# Patient Record
Sex: FEMALE | Race: OTHER | NOT HISPANIC OR LATINO | URBAN - METROPOLITAN AREA
[De-identification: names, ages, dates, MRNs, and addresses within clinical notes are randomized per-mention and may not be internally consistent; named-entity substitution may affect disease eponyms.]

---

## 2018-01-30 PROBLEM — Z00.00 ENCOUNTER FOR PREVENTIVE HEALTH EXAMINATION: Status: ACTIVE | Noted: 2018-01-30

## 2018-02-06 ENCOUNTER — OUTPATIENT (OUTPATIENT)
Dept: OUTPATIENT SERVICES | Facility: HOSPITAL | Age: 68
LOS: 1 days | Discharge: HOME | End: 2018-02-06

## 2018-02-06 DIAGNOSIS — G62.9 POLYNEUROPATHY, UNSPECIFIED: ICD-10-CM

## 2018-02-06 DIAGNOSIS — K21.9 GASTRO-ESOPHAGEAL REFLUX DISEASE WITHOUT ESOPHAGITIS: ICD-10-CM

## 2018-02-06 DIAGNOSIS — I10 ESSENTIAL (PRIMARY) HYPERTENSION: ICD-10-CM

## 2018-02-08 ENCOUNTER — APPOINTMENT (OUTPATIENT)
Dept: PODIATRY | Facility: CLINIC | Age: 68
End: 2018-02-08
Payer: MEDICARE

## 2018-02-08 ENCOUNTER — OUTPATIENT (OUTPATIENT)
Dept: OUTPATIENT SERVICES | Facility: HOSPITAL | Age: 68
LOS: 1 days | Discharge: HOME | End: 2018-02-08

## 2018-02-08 VITALS — DIASTOLIC BLOOD PRESSURE: 88 MMHG | SYSTOLIC BLOOD PRESSURE: 148 MMHG

## 2018-02-08 DIAGNOSIS — M20.5X9 OTHER DEFORMITIES OF TOE(S) (ACQUIRED), UNSPECIFIED FOOT: ICD-10-CM

## 2018-02-08 DIAGNOSIS — M20.41 OTHER HAMMER TOE(S) (ACQUIRED), RIGHT FOOT: ICD-10-CM

## 2018-02-08 DIAGNOSIS — M20.42 OTHER HAMMER TOE(S) (ACQUIRED), LEFT FOOT: ICD-10-CM

## 2018-02-08 DIAGNOSIS — M20.10 HALLUX VALGUS (ACQUIRED), UNSPECIFIED FOOT: ICD-10-CM

## 2018-02-08 DIAGNOSIS — B35.1 TINEA UNGUIUM: ICD-10-CM

## 2018-02-08 DIAGNOSIS — M79.673 PAIN IN UNSPECIFIED FOOT: ICD-10-CM

## 2018-02-08 DIAGNOSIS — M20.42 OTHER HAMMER TOE(S) (ACQUIRED), RIGHT FOOT: ICD-10-CM

## 2018-02-08 DIAGNOSIS — L85.3 XEROSIS CUTIS: ICD-10-CM

## 2018-02-08 PROCEDURE — ZZZZZ: CPT

## 2018-02-15 DIAGNOSIS — B35.1 TINEA UNGUIUM: ICD-10-CM

## 2018-02-15 DIAGNOSIS — L85.3 XEROSIS CUTIS: ICD-10-CM

## 2018-02-15 DIAGNOSIS — G62.9 POLYNEUROPATHY, UNSPECIFIED: ICD-10-CM

## 2018-02-20 ENCOUNTER — OUTPATIENT (OUTPATIENT)
Dept: OUTPATIENT SERVICES | Facility: HOSPITAL | Age: 68
LOS: 1 days | Discharge: HOME | End: 2018-02-20

## 2018-02-20 DIAGNOSIS — G62.9 POLYNEUROPATHY, UNSPECIFIED: ICD-10-CM

## 2018-02-20 DIAGNOSIS — K21.9 GASTRO-ESOPHAGEAL REFLUX DISEASE WITHOUT ESOPHAGITIS: ICD-10-CM

## 2018-04-19 ENCOUNTER — APPOINTMENT (OUTPATIENT)
Dept: PODIATRY | Facility: CLINIC | Age: 68
End: 2018-04-19
Payer: MEDICARE

## 2018-04-19 ENCOUNTER — OUTPATIENT (OUTPATIENT)
Dept: OUTPATIENT SERVICES | Facility: HOSPITAL | Age: 68
LOS: 1 days | Discharge: HOME | End: 2018-04-19

## 2018-04-19 VITALS — WEIGHT: 172 LBS | BODY MASS INDEX: 29.37 KG/M2 | HEIGHT: 64 IN

## 2018-04-19 DIAGNOSIS — G57.62 LESION OF PLANTAR NERVE, LEFT LOWER LIMB: ICD-10-CM

## 2018-04-19 DIAGNOSIS — G62.9 POLYNEUROPATHY, UNSPECIFIED: ICD-10-CM

## 2018-04-19 DIAGNOSIS — M79.672 PAIN IN LEFT FOOT: ICD-10-CM

## 2018-04-19 DIAGNOSIS — M79.671 PAIN IN RIGHT FOOT: ICD-10-CM

## 2018-04-19 DIAGNOSIS — G57.61 LESION OF PLANTAR NERVE, RIGHT LOWER LIMB: ICD-10-CM

## 2018-04-19 PROCEDURE — 99212 OFFICE O/P EST SF 10 MIN: CPT

## 2018-05-16 PROBLEM — G62.9 NEUROPATHIC PAIN OF BOTH FEET: Status: ACTIVE | Noted: 2018-02-08

## 2019-07-26 ENCOUNTER — APPOINTMENT (OUTPATIENT)
Dept: CARDIOLOGY | Facility: CLINIC | Age: 69
End: 2019-07-26

## 2019-07-30 ENCOUNTER — APPOINTMENT (OUTPATIENT)
Dept: VASCULAR SURGERY | Facility: CLINIC | Age: 69
End: 2019-07-30
Payer: MEDICARE

## 2019-07-30 VITALS
DIASTOLIC BLOOD PRESSURE: 80 MMHG | SYSTOLIC BLOOD PRESSURE: 122 MMHG | WEIGHT: 158 LBS | HEIGHT: 64 IN | BODY MASS INDEX: 26.98 KG/M2

## 2019-07-30 PROCEDURE — 99203 OFFICE O/P NEW LOW 30 MIN: CPT

## 2019-07-30 PROCEDURE — 93970 EXTREMITY STUDY: CPT

## 2019-07-30 NOTE — DATA REVIEWED
[FreeTextEntry1] : I performed a venous duplex which was medically necessary to evaluate for GSV incompetency. No evidence of DVT or superficial phlebitis in the lower extremities bilaterally, right GSV not visualized, left GSV was negative for reflux, incompetent varicosities in the right thigh and calf measuring 6 mm.\par

## 2019-07-30 NOTE — ASSESSMENT
[FreeTextEntry1] : 69 y/o female with h/o varicose veins, presents for evaluation, has h/o right leg vein ligation in [place] several years ago. She c/o pain in the varicosities, and leg swelling at the end of the day, especially the right leg, denies any h/o DVT, could not tolerate compression stockings in the past.\par I performed a venous duplex which was medically necessary to evaluate for GSV incompetency. No evidence of DVT or superficial phlebitis in the lower extremities bilaterally, right GSV not visualized, left GSV was negative for reflux, incompetent varicosities in the right thigh and calf measuring 6 mm.\par She will require right lower extremity stab microphlebectomy for treatment. She prefers conservative management and was advised to use compression stockings as tolerated. She can follow up as needed.

## 2019-07-30 NOTE — HISTORY OF PRESENT ILLNESS
[FreeTextEntry1] : 69 y/o female with h/o varicose veins, presents for evaluation, has h/o right leg vein ligation in [place] several years ago. She c/o pain in the varicosities, and leg swelling at the end of the day, especially the right leg, denies any h/o DVT, could not tolerate compression stockings in the past.

## 2019-08-22 ENCOUNTER — APPOINTMENT (OUTPATIENT)
Dept: CARDIOLOGY | Facility: CLINIC | Age: 69
End: 2019-08-22
Payer: MEDICARE

## 2019-08-22 PROCEDURE — 99214 OFFICE O/P EST MOD 30 MIN: CPT

## 2019-08-22 PROCEDURE — 93000 ELECTROCARDIOGRAM COMPLETE: CPT

## 2019-09-10 ENCOUNTER — APPOINTMENT (OUTPATIENT)
Dept: VASCULAR SURGERY | Facility: CLINIC | Age: 69
End: 2019-09-10
Payer: MEDICARE

## 2019-09-10 VITALS
DIASTOLIC BLOOD PRESSURE: 74 MMHG | HEIGHT: 64 IN | WEIGHT: 156 LBS | BODY MASS INDEX: 26.63 KG/M2 | SYSTOLIC BLOOD PRESSURE: 120 MMHG

## 2019-09-10 DIAGNOSIS — I73.9 PERIPHERAL VASCULAR DISEASE, UNSPECIFIED: ICD-10-CM

## 2019-09-10 PROCEDURE — 93925 LOWER EXTREMITY STUDY: CPT

## 2019-09-10 PROCEDURE — 99213 OFFICE O/P EST LOW 20 MIN: CPT

## 2019-09-10 NOTE — HISTORY OF PRESENT ILLNESS
[FreeTextEntry1] : 69 y/o female with h/o varicose veins, presents for evaluation, has h/o right leg vein ligation in [place] several years ago. She c/o pain in the varicosities, and leg swelling at the end of the day, especially the right leg, denies any h/o DVT, could not tolerate compression stockings in the past. Today she presents for an arterial evaluation.

## 2019-09-10 NOTE — ASSESSMENT
[FreeTextEntry1] : The patient is a 68 row female with symptomatic varicose veins on her right lower extremity. She presents today for evaluation of her lower extremities discomfort and evaluation for peripheral vascular disease. The patient has normal arterial hemodynamics on physical exam as well as arterial duplex. Patient has a history of greater saphenous vein stripped back in [place]. I believe the patient will benefit from micro-stab phlebectomy of tributary veins to help relieve her symptoms.

## 2019-10-23 ENCOUNTER — OUTPATIENT (OUTPATIENT)
Dept: OUTPATIENT SERVICES | Facility: HOSPITAL | Age: 69
LOS: 1 days | Discharge: HOME | End: 2019-10-23
Payer: MEDICARE

## 2019-10-23 ENCOUNTER — APPOINTMENT (OUTPATIENT)
Dept: VASCULAR SURGERY | Facility: HOSPITAL | Age: 69
End: 2019-10-23

## 2019-10-23 ENCOUNTER — RESULT REVIEW (OUTPATIENT)
Age: 69
End: 2019-10-23

## 2019-10-23 VITALS
WEIGHT: 149.03 LBS | DIASTOLIC BLOOD PRESSURE: 91 MMHG | SYSTOLIC BLOOD PRESSURE: 188 MMHG | RESPIRATION RATE: 18 BRPM | TEMPERATURE: 98 F | HEART RATE: 75 BPM | HEIGHT: 64 IN

## 2019-10-23 VITALS — RESPIRATION RATE: 20 BRPM | SYSTOLIC BLOOD PRESSURE: 134 MMHG | HEART RATE: 64 BPM | DIASTOLIC BLOOD PRESSURE: 83 MMHG

## 2019-10-23 DIAGNOSIS — I99.9 UNSPECIFIED DISORDER OF CIRCULATORY SYSTEM: Chronic | ICD-10-CM

## 2019-10-23 PROCEDURE — 37766 PHLEB VEINS - EXTREM 20+: CPT

## 2019-10-23 PROCEDURE — 88304 TISSUE EXAM BY PATHOLOGIST: CPT | Mod: 26

## 2019-10-23 RX ORDER — SODIUM CHLORIDE 9 MG/ML
1000 INJECTION, SOLUTION INTRAVENOUS
Refills: 0 | Status: DISCONTINUED | OUTPATIENT
Start: 2019-10-23 | End: 2019-10-23

## 2019-10-23 RX ORDER — ASPIRIN/CALCIUM CARB/MAGNESIUM 324 MG
1 TABLET ORAL
Qty: 0 | Refills: 0 | DISCHARGE

## 2019-10-23 RX ORDER — HYDROMORPHONE HYDROCHLORIDE 2 MG/ML
0.5 INJECTION INTRAMUSCULAR; INTRAVENOUS; SUBCUTANEOUS
Refills: 0 | Status: DISCONTINUED | OUTPATIENT
Start: 2019-10-23 | End: 2019-10-23

## 2019-10-23 RX ORDER — LISINOPRIL 2.5 MG/1
1 TABLET ORAL
Qty: 0 | Refills: 0 | DISCHARGE

## 2019-10-23 RX ORDER — METOPROLOL TARTRATE 50 MG
1 TABLET ORAL
Qty: 0 | Refills: 0 | DISCHARGE

## 2019-10-23 RX ORDER — DILTIAZEM HCL 120 MG
1 CAPSULE, EXT RELEASE 24 HR ORAL
Qty: 0 | Refills: 0 | DISCHARGE

## 2019-10-23 RX ADMIN — SODIUM CHLORIDE 100 MILLILITER(S): 9 INJECTION, SOLUTION INTRAVENOUS at 13:31

## 2019-10-23 NOTE — ASU DISCHARGE PLAN (ADULT/PEDIATRIC) - CALL YOUR DOCTOR IF YOU HAVE ANY OF THE FOLLOWING:
Wound/Surgical Site with redness, or foul smelling discharge or pus/Fever greater than (need to indicate Fahrenheit or Celsius)/Bleeding that does not stop/Swelling that gets worse/Pain not relieved by Medications/Numbness, tingling, color or temperature change to extremity

## 2019-10-23 NOTE — H&P PST ADULT - ASSESSMENT
69 y/o female with h/o varicose veins, presents for recommended stab phlebectomy procedure. Pt has h/o right leg vein ligation in [place] several years ago. She c/o pain in the varicosities, and leg swelling at the end of the day, especially the right leg, denies any h/o DVT, could not tolerate compression stockings in the past. She was evaluated in the office. The patient has normal arterial hemodynamics on physical exam as well as arterial duplex.  She now presents for right leg stab phlebectomy.

## 2019-10-23 NOTE — ASU DISCHARGE PLAN (ADULT/PEDIATRIC) - CARE PROVIDER_API CALL
Taj Hightower)  Vascular Surgery  [address] [place] [address]  Phone: [phone]  Fax: [phone]  Follow Up Time:

## 2019-10-23 NOTE — H&P PST ADULT - HISTORY OF PRESENT ILLNESS
69 y/o female with h/o varicose veins, presents for recommended stab phlebectomy procedure. Pt has h/o right leg vein ligation in [place] several years ago. She c/o pain in the varicosities, and leg swelling at the end of the day, especially the right leg, denies any h/o DVT, could not tolerate compression stockings in the past. She was evaluated in the office. The patient has normal arterial hemodynamics on physical exam as well as arterial duplex.  She now presents for right leg stab phlebectomy.

## 2019-10-23 NOTE — CHART NOTE - NSCHARTNOTEFT_GEN_A_CORE
PACU ANESTHESIA ADMISSION NOTE      Procedure: Stab phlebectomy, varicose vein, greater than 20 stab incisions    Post op diagnosis:  Varicose veins of lower extremity      ____  Intubated  TV:______       Rate: ______      FiO2: ______    __x__  Patent Airway    _x___  Full return of protective reflexes    ___x_  Full recovery from anesthesia / back to baseline     Vitals:   T:  97.2         R:  12                BP:  132/69                Sat: 100                  P: 57      Mental Status:  __x__ Awake   __x___ Alert   _____ Drowsy   _____ Sedated    Nausea/Vomiting:  _x___ NO  ______Yes,   See Post - Op Orders          Pain Scale (0-10):  _____    Treatment: __x__ None    __x__ See Post - Op/PCA Orders    Post - Operative Fluids:   ___x_ Oral   ____ See Post - Op Orders    Plan: Discharge:   __x__Home       _____Floor     _____Critical Care    _____  Other:_________________    Comments: tolerated procedure well

## 2019-10-23 NOTE — BRIEF OPERATIVE NOTE - NSICDXBRIEFPROCEDURE_GEN_ALL_CORE_FT
PROCEDURES:  Stab phlebectomy, varicose vein, greater than 20 stab incisions 23-Oct-2019 13:08:12  Taj Hightower

## 2019-10-24 PROBLEM — I10 ESSENTIAL (PRIMARY) HYPERTENSION: Chronic | Status: ACTIVE | Noted: 2019-10-23

## 2019-10-28 LAB — SURGICAL PATHOLOGY STUDY: SIGNIFICANT CHANGE UP

## 2019-10-29 DIAGNOSIS — Z91.010 ALLERGY TO PEANUTS: ICD-10-CM

## 2019-10-29 DIAGNOSIS — I83.891 VARICOSE VEINS OF RIGHT LOWER EXTREMITY WITH OTHER COMPLICATIONS: ICD-10-CM

## 2019-10-29 DIAGNOSIS — I10 ESSENTIAL (PRIMARY) HYPERTENSION: ICD-10-CM

## 2019-10-29 DIAGNOSIS — Z91.018 ALLERGY TO OTHER FOODS: ICD-10-CM

## 2019-11-05 ENCOUNTER — APPOINTMENT (OUTPATIENT)
Dept: VASCULAR SURGERY | Facility: CLINIC | Age: 69
End: 2019-11-05
Payer: MEDICARE

## 2019-11-05 DIAGNOSIS — I83.893 VARICOSE VEINS OF BILATERAL LOWER EXTREMITIES WITH OTHER COMPLICATIONS: ICD-10-CM

## 2019-11-05 PROCEDURE — 99024 POSTOP FOLLOW-UP VISIT: CPT

## 2019-11-05 NOTE — ASSESSMENT
[FreeTextEntry1] : 70 y/o female with h/o symptomatic right lower extremity varicose veins, underwent stab microphlebectomy on 10/23/19, presents for follow up, denies any complaints.\par She is happy with the results and can follow up as needed.

## 2019-11-05 NOTE — HISTORY OF PRESENT ILLNESS
[FreeTextEntry1] : 70 y/o female with h/o symptomatic right lower extremity varicose veins, underwent stab microphlebectomy on 10/23/19, presents for follow up, denies any complaints.

## 2020-02-20 ENCOUNTER — APPOINTMENT (OUTPATIENT)
Dept: CARDIOLOGY | Facility: CLINIC | Age: 70
End: 2020-02-20
Payer: MEDICARE

## 2020-02-20 PROCEDURE — 99213 OFFICE O/P EST LOW 20 MIN: CPT

## 2020-02-20 PROCEDURE — 93000 ELECTROCARDIOGRAM COMPLETE: CPT

## 2022-01-24 NOTE — PRE-ANESTHESIA EVALUATION ADULT - MALLAMPATI CLASS
[Patient reported PAP Smear was normal] : Patient reported PAP Smear was normal [N] : Patient does not use contraception [Y] : Patient is sexually active [Monogamous (Male Partner)] : is monogamous with a male partner [Patient would like to be screened for STIs] : Patient would like to be screened for STIs [FreeTextEntry1] : 34 yo presents for initial annual exam and pap smear with complaint of dysmenorrhea and PMS.  States she was diagnosed with PCOS but declined to start medications at that time. [PapSmeardate] : 1/22/2018 [LMPDate] : 12/24/21 Class III - visualization of the soft palate and the base of the uvula

## 2022-05-10 ENCOUNTER — APPOINTMENT (OUTPATIENT)
Dept: CARDIOLOGY | Facility: CLINIC | Age: 72
End: 2022-05-10
Payer: MEDICARE

## 2022-05-10 VITALS
HEIGHT: 63 IN | SYSTOLIC BLOOD PRESSURE: 120 MMHG | DIASTOLIC BLOOD PRESSURE: 74 MMHG | BODY MASS INDEX: 32.96 KG/M2 | WEIGHT: 186 LBS

## 2022-05-10 DIAGNOSIS — M79.673 PAIN IN UNSPECIFIED FOOT: ICD-10-CM

## 2022-05-10 DIAGNOSIS — Z82.49 FAMILY HISTORY OF ISCHEMIC HEART DISEASE AND OTHER DISEASES OF THE CIRCULATORY SYSTEM: ICD-10-CM

## 2022-05-10 PROCEDURE — 93000 ELECTROCARDIOGRAM COMPLETE: CPT

## 2022-05-10 PROCEDURE — 99214 OFFICE O/P EST MOD 30 MIN: CPT

## 2022-05-10 RX ORDER — DICLOFENAC SODIUM 10 MG/G
1 GEL TOPICAL DAILY
Qty: 100 | Refills: 2 | Status: DISCONTINUED | COMMUNITY
Start: 2018-04-19 | End: 2022-05-10

## 2022-05-10 RX ORDER — AMMONIUM LACTATE 12 %
12 CREAM (GRAM) TOPICAL TWICE DAILY
Qty: 1 | Refills: 3 | Status: DISCONTINUED | COMMUNITY
Start: 2018-02-08 | End: 2022-05-10

## 2022-05-10 RX ORDER — CAPSAICIN 0.1 G/100G
0.1 CREAM TOPICAL 4 TIMES DAILY
Qty: 1 | Refills: 3 | Status: DISCONTINUED | COMMUNITY
Start: 2018-02-08 | End: 2022-05-10

## 2022-05-10 RX ORDER — DICLOFENAC SODIUM 10 MG/G
1 GEL TOPICAL DAILY
Qty: 1 | Refills: 3 | Status: DISCONTINUED | COMMUNITY
Start: 2018-02-08 | End: 2022-05-10

## 2022-05-10 RX ORDER — DILTIAZEM HYDROCHLORIDE 240 MG/1
240 CAPSULE, EXTENDED RELEASE ORAL DAILY
Refills: 0 | Status: ACTIVE | COMMUNITY

## 2022-05-10 RX ORDER — CLOTRIMAZOLE 10 MG/ML
1 SOLUTION TOPICAL TWICE DAILY
Qty: 1 | Refills: 3 | Status: DISCONTINUED | COMMUNITY
Start: 2018-02-08 | End: 2022-05-10

## 2022-05-10 RX ORDER — DILTIAZEM HYDROCHLORIDE 90 MG/1
TABLET ORAL
Refills: 0 | Status: DISCONTINUED | COMMUNITY
End: 2022-05-10

## 2022-05-10 RX ORDER — GABAPENTIN 100 MG/1
CAPSULE ORAL
Refills: 0 | Status: DISCONTINUED | COMMUNITY
End: 2022-05-10

## 2022-05-10 NOTE — CARDIOLOGY SUMMARY
[de-identified] : 05/10/22:\par Normal. [de-identified] : Stress ECHO 03/12/2019:\par 10 METs, no ischemia\par Normal EF\par Mild TR

## 2022-05-10 NOTE — PHYSICAL EXAM
[Well Developed] : well developed [No Acute Distress] : no acute distress [Obese] : obese [Normal Conjunctiva] : normal conjunctiva [Normal Venous Pressure] : normal venous pressure [No Carotid Bruit] : no carotid bruit [Normal S1, S2] : normal S1, S2 [No Rub] : no rub [S4] : S4 [Murmur] : murmur [Clear Lung Fields] : clear lung fields [Good Air Entry] : good air entry [No Respiratory Distress] : no respiratory distress  [Soft] : abdomen soft [Non Tender] : non-tender [Normal Bowel Sounds] : normal bowel sounds [Normal Gait] : normal gait [No Edema] : no edema [No Cyanosis] : no cyanosis [No Clubbing] : no clubbing [Normal PT B/L] : normal PT B/L [Venous varicosities] : venous varicosities [No Rash] : no rash [Moves all extremities] : moves all extremities [No Focal Deficits] : no focal deficits [Normal Speech] : normal speech [Alert and Oriented] : alert and oriented [Normal memory] : normal memory [de-identified] : 2/6 [facility]

## 2022-05-10 NOTE — REVIEW OF SYSTEMS
[Weight Gain (___ Lbs)] : [unfilled] ~Ulb weight gain [Feeling Fatigued] : feeling fatigued [Dyspnea on exertion] : dyspnea during exertion [Chest Discomfort] : chest discomfort [Negative] : Neurological [Fever] : no fever [Chills] : no chills [Blurry Vision] : no blurred vision [Lower Ext Edema] : no extremity edema [Leg Claudication] : no intermittent leg claudication [Palpitations] : no palpitations [Syncope] : no syncope [Anxiety] : no anxiety [Easy Bruising] : no tendency for easy bruising

## 2022-05-10 NOTE — ASSESSMENT
[FreeTextEntry1] : 71-yo female with HTN and hyperlipidemia.\par Increased BELLAMY.\par Atypical CP.\par Significant hyperlipidemia.\par \par Recommend:\par Will replace Metoprolol with Bisoprolol 2.5 mg daily.\par Patient advised to start Rosuvastatin (prescribed by PCP).\par DIet, weight loss discussed.\par Exercise stress echocardiogram.\par \par Mryon Adams MD\par

## 2022-05-11 ENCOUNTER — RESULT CHARGE (OUTPATIENT)
Age: 72
End: 2022-05-11

## 2022-06-10 ENCOUNTER — APPOINTMENT (OUTPATIENT)
Dept: CARDIOLOGY | Facility: CLINIC | Age: 72
End: 2022-06-10
Payer: MEDICARE

## 2022-06-10 DIAGNOSIS — R07.89 OTHER CHEST PAIN: ICD-10-CM

## 2022-06-10 PROCEDURE — 93325 DOPPLER ECHO COLOR FLOW MAPG: CPT

## 2022-06-10 PROCEDURE — 93320 DOPPLER ECHO COMPLETE: CPT

## 2022-06-10 PROCEDURE — 93351 STRESS TTE COMPLETE: CPT

## 2022-11-16 ENCOUNTER — RX RENEWAL (OUTPATIENT)
Age: 72
End: 2022-11-16

## 2023-04-17 ENCOUNTER — APPOINTMENT (OUTPATIENT)
Dept: CARDIOLOGY | Facility: CLINIC | Age: 73
End: 2023-04-17
Payer: MEDICARE

## 2023-04-17 VITALS
SYSTOLIC BLOOD PRESSURE: 160 MMHG | DIASTOLIC BLOOD PRESSURE: 90 MMHG | HEART RATE: 58 BPM | BODY MASS INDEX: 30.47 KG/M2 | WEIGHT: 171.96 LBS | HEIGHT: 63 IN

## 2023-04-17 VITALS — SYSTOLIC BLOOD PRESSURE: 144 MMHG | DIASTOLIC BLOOD PRESSURE: 92 MMHG

## 2023-04-17 PROCEDURE — 93000 ELECTROCARDIOGRAM COMPLETE: CPT

## 2023-04-17 PROCEDURE — 99214 OFFICE O/P EST MOD 30 MIN: CPT

## 2023-04-17 RX ORDER — METOPROLOL SUCCINATE 25 MG/1
25 TABLET, EXTENDED RELEASE ORAL DAILY
Refills: 0 | Status: DISCONTINUED | COMMUNITY
End: 2023-04-17

## 2023-04-17 RX ORDER — SILVER SULFADIAZINE 10 MG/G
1 CREAM TOPICAL
Qty: 20 | Refills: 0 | Status: DISCONTINUED | COMMUNITY
Start: 2022-12-19

## 2023-04-17 RX ORDER — DILTIAZEM HYDROCHLORIDE 240 MG/1
240 CAPSULE, EXTENDED RELEASE ORAL
Qty: 90 | Refills: 0 | Status: DISCONTINUED | COMMUNITY
Start: 2023-02-21

## 2023-04-17 RX ORDER — POLYETHYLENE GLYCOL-3350 AND ELECTROLYTES 236; 6.74; 5.86; 2.97; 22.74 G/274.31G; G/274.31G; G/274.31G; G/274.31G; G/274.31G
236 POWDER, FOR SOLUTION ORAL
Qty: 4000 | Refills: 0 | Status: DISCONTINUED | COMMUNITY
Start: 2023-03-03

## 2023-04-17 RX ORDER — ESOMEPRAZOLE MAGNESIUM 40 MG/1
40 CAPSULE, DELAYED RELEASE ORAL
Qty: 30 | Refills: 0 | Status: DISCONTINUED | COMMUNITY
Start: 2023-03-01

## 2023-04-17 RX ORDER — CICLOPIROX OLAMINE 7.7 MG/G
0.77 CREAM TOPICAL
Qty: 60 | Refills: 0 | Status: DISCONTINUED | COMMUNITY
Start: 2023-01-31

## 2023-04-17 NOTE — CARDIOLOGY SUMMARY
[de-identified] : 04/17/23:\par Normal. [de-identified] : Stress ECHO 03/12/2019:\par 10 METs, no ischemia\par Normal EF\par Mild TR

## 2023-04-17 NOTE — REVIEW OF SYSTEMS
[Feeling Fatigued] : feeling fatigued [Dyspnea on exertion] : dyspnea during exertion [Negative] : Neurological [Fever] : no fever [Chills] : no chills [Blurry Vision] : no blurred vision [Leg Claudication] : no intermittent leg claudication [Lower Ext Edema] : no extremity edema [Palpitations] : no palpitations [Syncope] : no syncope [Anxiety] : no anxiety [Easy Bruising] : no tendency for easy bruising

## 2023-04-17 NOTE — PHYSICAL EXAM
[Well Developed] : well developed [No Acute Distress] : no acute distress [Obese] : obese [Normal Conjunctiva] : normal conjunctiva [Normal Venous Pressure] : normal venous pressure [No Carotid Bruit] : no carotid bruit [Normal S1, S2] : normal S1, S2 [No Rub] : no rub [S4] : S4 [Murmur] : murmur [Clear Lung Fields] : clear lung fields [Good Air Entry] : good air entry [No Respiratory Distress] : no respiratory distress  [Soft] : abdomen soft [Non Tender] : non-tender [Normal Bowel Sounds] : normal bowel sounds [Normal Gait] : normal gait [No Edema] : no edema [No Cyanosis] : no cyanosis [No Clubbing] : no clubbing [Normal PT B/L] : normal PT B/L [Venous varicosities] : venous varicosities [No Rash] : no rash [Moves all extremities] : moves all extremities [No Focal Deficits] : no focal deficits [Normal Speech] : normal speech [Alert and Oriented] : alert and oriented [Normal memory] : normal memory [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

## 2023-04-17 NOTE — ASSESSMENT
[FreeTextEntry1] : 71-yo female with HTN and severe hyperlipidemia.\par BELLAMY but exercise capacity 10 METs. Normal LVEF, no evidence of ischemia.\par \par Recommend:\par Continue treatment for HTN. Patient advised to restart HCTZ.\par Patient advised to resume Rosuvastatin. Importance of risk factor modification d/w patient again.\par DIet, weight loss discussed.\par F/u in 3 months with fresh b lood work.\par \par Myron Adams MD\par

## 2023-04-17 NOTE — HISTORY OF PRESENT ILLNESS
[FreeTextEntry1] : 72-yo female with h/o HTN and hyperlipidemia.\par \par Patient denies CP, still c/o BELLAMY. She has been non-compliant with statin and HCTZ after researching their side effects.\par \par Stress ECHO 06/10/22:\par 10 METs, no ischemia\par Normal LVEF, G1DD\par Mild MR, TR.\par \par .\par GFR 93

## 2023-04-18 ENCOUNTER — RESULT CHARGE (OUTPATIENT)
Age: 73
End: 2023-04-18

## 2023-05-03 NOTE — ASU PREOP CHECKLIST - LAST DOSE WITHIN LAST 24HRS
Yes Advancement Flap (Double) Text: The defect edges were debeveled with a #15 scalpel blade.  Given the location of the defect and the proximity to free margins a double advancement flap was deemed most appropriate.  Using a sterile surgical marker, the appropriate advancement flaps were drawn incorporating the defect and placing the expected incisions within the relaxed skin tension lines where possible.    The area thus outlined was incised deep to adipose tissue with a #15 scalpel blade and carried over to the primary defect.  The skin margins were undermined to an appropriate distance in all directions utilizing iris scissors.

## 2023-07-25 ENCOUNTER — APPOINTMENT (OUTPATIENT)
Dept: CARDIOLOGY | Facility: CLINIC | Age: 73
End: 2023-07-25
Payer: MEDICARE

## 2023-07-25 ENCOUNTER — RESULT CHARGE (OUTPATIENT)
Age: 73
End: 2023-07-25

## 2023-07-25 VITALS
BODY MASS INDEX: 28.35 KG/M2 | HEIGHT: 63 IN | DIASTOLIC BLOOD PRESSURE: 80 MMHG | WEIGHT: 160 LBS | SYSTOLIC BLOOD PRESSURE: 130 MMHG | HEART RATE: 71 BPM

## 2023-07-25 DIAGNOSIS — R06.02 SHORTNESS OF BREATH: ICD-10-CM

## 2023-07-25 PROCEDURE — 93000 ELECTROCARDIOGRAM COMPLETE: CPT

## 2023-07-25 PROCEDURE — 99213 OFFICE O/P EST LOW 20 MIN: CPT

## 2023-07-25 RX ORDER — ROSUVASTATIN CALCIUM 5 MG/1
5 TABLET, FILM COATED ORAL
Qty: 90 | Refills: 1 | Status: ACTIVE | COMMUNITY
Start: 1900-01-01 | End: 1900-01-01

## 2023-07-25 RX ORDER — HYDROCHLOROTHIAZIDE 12.5 MG/1
12.5 TABLET ORAL
Refills: 0 | Status: DISCONTINUED | COMMUNITY
End: 2023-07-25

## 2023-07-25 RX ORDER — METFORMIN ER 500 MG 500 MG/1
500 TABLET ORAL DAILY
Qty: 30 | Refills: 5 | Status: ACTIVE | COMMUNITY
Start: 2023-07-25

## 2023-07-25 RX ORDER — BISOPROLOL FUMARATE 5 MG/1
5 TABLET, FILM COATED ORAL
Qty: 45 | Refills: 1 | Status: DISCONTINUED | COMMUNITY
Start: 2022-05-10 | End: 2023-07-25

## 2023-07-25 NOTE — PHYSICAL EXAM
[Well Developed] : well developed [No Acute Distress] : no acute distress [Obese] : obese [Normal Conjunctiva] : normal conjunctiva [Normal Venous Pressure] : normal venous pressure [No Carotid Bruit] : no carotid bruit [Normal S1, S2] : normal S1, S2 [No Rub] : no rub [S4] : S4 [Murmur] : murmur [Clear Lung Fields] : clear lung fields [Good Air Entry] : good air entry [No Respiratory Distress] : no respiratory distress  [Soft] : abdomen soft [Non Tender] : non-tender [Normal Bowel Sounds] : normal bowel sounds [Normal Gait] : normal gait [No Edema] : no edema [No Cyanosis] : no cyanosis [No Clubbing] : no clubbing [Normal PT B/L] : normal PT B/L [Venous varicosities] : venous varicosities [No Rash] : no rash [Moves all extremities] : moves all extremities [No Focal Deficits] : no focal deficits [Normal Speech] : normal speech [Alert and Oriented] : alert and oriented [Normal memory] : normal memory [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

## 2023-07-25 NOTE — ASSESSMENT
[FreeTextEntry1] : 73 y/o female with HTN and severe hyperlipidemia.\par BELLAMY but exercise capacity 10 METs. Normal LVEF, no evidence of ischemia.\par \par \par Plan:\par Continue treatment for HTN. \par Patient advised to take Rosuvastatin daily (will reduce to 5 mg).\par DIet, weight loss discussed.\par F/u in 6 months with fresh blood work.\par \par Myron Adams MD\par

## 2023-07-25 NOTE — REVIEW OF SYSTEMS
[Feeling Fatigued] : feeling fatigued [Dyspnea on exertion] : dyspnea during exertion [Negative] : Neurological [Fever] : no fever [Chills] : no chills [Blurry Vision] : no blurred vision [Lower Ext Edema] : no extremity edema [Leg Claudication] : no intermittent leg claudication [Palpitations] : no palpitations [Syncope] : no syncope [Anxiety] : no anxiety [Easy Bruising] : no tendency for easy bruising

## 2023-07-25 NOTE — CARDIOLOGY SUMMARY
[de-identified] : 07/25/23:\par Normal. [de-identified] : Stress ECHO 03/12/2019:\par 10 METs, no ischemia\par Normal EF\par Mild TR

## 2023-07-25 NOTE — HISTORY OF PRESENT ILLNESS
[FreeTextEntry1] : 71 y/o female with h/o HTN and hyperlipidemia.\par \par Patient denies CP, still c/o BELLAMY. She has been taking statin every other day.\par \par Stress ECHO 06/10/22:\par 10 METs, no ischemia\par Normal LVEF, G1DD\par Mild MR, TR.\par \par LDL 77 (240).\par GFR 79\par Hb A1c 6.3.

## 2024-01-04 ENCOUNTER — APPOINTMENT (OUTPATIENT)
Dept: CARDIOLOGY | Facility: CLINIC | Age: 74
End: 2024-01-04

## 2024-03-12 ENCOUNTER — APPOINTMENT (OUTPATIENT)
Dept: CARDIOLOGY | Facility: CLINIC | Age: 74
End: 2024-03-12
Payer: MEDICARE

## 2024-03-12 VITALS
HEIGHT: 63 IN | HEART RATE: 57 BPM | WEIGHT: 163 LBS | DIASTOLIC BLOOD PRESSURE: 90 MMHG | BODY MASS INDEX: 28.88 KG/M2 | SYSTOLIC BLOOD PRESSURE: 156 MMHG

## 2024-03-12 DIAGNOSIS — I10 ESSENTIAL (PRIMARY) HYPERTENSION: ICD-10-CM

## 2024-03-12 DIAGNOSIS — E78.5 HYPERLIPIDEMIA, UNSPECIFIED: ICD-10-CM

## 2024-03-12 PROCEDURE — 99213 OFFICE O/P EST LOW 20 MIN: CPT

## 2024-03-12 PROCEDURE — 93000 ELECTROCARDIOGRAM COMPLETE: CPT

## 2024-03-12 RX ORDER — HYDROCHLOROTHIAZIDE 12.5 MG/1
12.5 CAPSULE ORAL DAILY
Refills: 0 | Status: ACTIVE | COMMUNITY

## 2024-03-12 RX ORDER — LISINOPRIL 40 MG/1
40 TABLET ORAL DAILY
Refills: 0 | Status: ACTIVE | COMMUNITY

## 2024-03-12 RX ORDER — ASPIRIN 81 MG
81 TABLET, DELAYED RELEASE (ENTERIC COATED) ORAL DAILY
Refills: 0 | Status: DISCONTINUED | COMMUNITY
End: 2024-03-12

## 2024-03-12 RX ORDER — BISOPROLOL FUMARATE 5 MG/1
5 TABLET, FILM COATED ORAL DAILY
Refills: 0 | Status: ACTIVE | COMMUNITY

## 2024-03-13 PROBLEM — I10 BENIGN ESSENTIAL HYPERTENSION: Status: ACTIVE | Noted: 2022-05-10

## 2024-03-13 PROBLEM — E78.5 HYPERLIPIDEMIA: Status: ACTIVE | Noted: 2022-05-10

## 2024-03-13 NOTE — PHYSICAL EXAM
[Well Developed] : well developed [No Acute Distress] : no acute distress [Obese] : obese [Normal Venous Pressure] : normal venous pressure [Normal Conjunctiva] : normal conjunctiva [No Carotid Bruit] : no carotid bruit [No Rub] : no rub [Normal S1, S2] : normal S1, S2 [S4] : S4 [Murmur] : murmur [Clear Lung Fields] : clear lung fields [Good Air Entry] : good air entry [No Respiratory Distress] : no respiratory distress  [Soft] : abdomen soft [Non Tender] : non-tender [Normal Bowel Sounds] : normal bowel sounds [Normal Gait] : normal gait [No Edema] : no edema [No Cyanosis] : no cyanosis [No Clubbing] : no clubbing [Normal PT B/L] : normal PT B/L [Venous varicosities] : venous varicosities [No Rash] : no rash [Moves all extremities] : moves all extremities [No Focal Deficits] : no focal deficits [Normal Speech] : normal speech [Alert and Oriented] : alert and oriented [Normal memory] : normal memory [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

## 2024-03-13 NOTE — CARDIOLOGY SUMMARY
[de-identified] : 3/12/24: SR, normal ECG. [de-identified] : Stress ECHO 06/10/22: 10 METs, no ischemia Normal LVEF, G1DD Mild MR, TR  Stress ECHO 03/12/2019: 10 METs, no ischemia Normal EF Mild TR

## 2024-03-13 NOTE — HISTORY OF PRESENT ILLNESS
[FreeTextEntry1] : 74 y/o female with h/o HTN and hyperlipidemia.  Patient presents for a follow up visit. Denies chest pain or SOB now.  Does not take bisoprolol if HR <60. Does not take HCTZ daily, takes it twice a week. BP at home elevated 140s-160s/70s/90s.    not taking statin since December. GFR 96 Hb A1c 6.0 TSH 5.05.

## 2024-03-13 NOTE — ASSESSMENT
[FreeTextEntry1] : 72 y/o female with HTN and severe hyperlipidemia. 10 METs. Normal LVEF, no evidence of ischemia.   Plan: Continue treatment for HTN.  Resume HCTZ 12.5 mg daily. Patient advised to take Rosuvastatin daily (will reduce to 5 mg). Long conversation again about importance of risk factor modification and indication for statins.  F/u in 6 months with fresh blood work.  Myron Adams MD

## 2024-09-17 ENCOUNTER — APPOINTMENT (OUTPATIENT)
Dept: CARDIOLOGY | Facility: CLINIC | Age: 74
End: 2024-09-17

## 2024-09-27 ENCOUNTER — OUTPATIENT (OUTPATIENT)
Dept: OUTPATIENT SERVICES | Facility: HOSPITAL | Age: 74
LOS: 1 days | Discharge: HOME | End: 2024-09-27
Payer: MEDICARE

## 2024-09-27 VITALS
TEMPERATURE: 100 F | OXYGEN SATURATION: 97 % | SYSTOLIC BLOOD PRESSURE: 160 MMHG | DIASTOLIC BLOOD PRESSURE: 90 MMHG | RESPIRATION RATE: 17 BRPM | WEIGHT: 169.98 LBS | HEART RATE: 82 BPM | HEIGHT: 64 IN

## 2024-09-27 VITALS — DIASTOLIC BLOOD PRESSURE: 86 MMHG | SYSTOLIC BLOOD PRESSURE: 156 MMHG | HEART RATE: 79 BPM

## 2024-09-27 DIAGNOSIS — H25.12 AGE-RELATED NUCLEAR CATARACT, LEFT EYE: ICD-10-CM

## 2024-09-27 DIAGNOSIS — I99.9 UNSPECIFIED DISORDER OF CIRCULATORY SYSTEM: Chronic | ICD-10-CM

## 2024-09-27 DIAGNOSIS — Z98.890 OTHER SPECIFIED POSTPROCEDURAL STATES: Chronic | ICD-10-CM

## 2024-09-27 LAB — GLUCOSE BLDC GLUCOMTR-MCNC: 134 MG/DL — HIGH (ref 70–99)

## 2024-09-27 PROCEDURE — V2632: CPT

## 2024-09-27 PROCEDURE — 82962 GLUCOSE BLOOD TEST: CPT

## 2024-09-27 NOTE — ASU PATIENT PROFILE, ADULT - FALL HARM RISK - HARM RISK INTERVENTIONS

## 2024-10-01 DIAGNOSIS — I10 ESSENTIAL (PRIMARY) HYPERTENSION: ICD-10-CM

## 2024-10-01 DIAGNOSIS — Z79.82 LONG TERM (CURRENT) USE OF ASPIRIN: ICD-10-CM

## 2024-10-01 DIAGNOSIS — H26.8 OTHER SPECIFIED CATARACT: ICD-10-CM

## 2024-10-01 DIAGNOSIS — Z79.84 LONG TERM (CURRENT) USE OF ORAL HYPOGLYCEMIC DRUGS: ICD-10-CM

## 2024-10-01 DIAGNOSIS — E11.36 TYPE 2 DIABETES MELLITUS WITH DIABETIC CATARACT: ICD-10-CM

## 2024-10-01 PROBLEM — E11.9 TYPE 2 DIABETES MELLITUS WITHOUT COMPLICATIONS: Chronic | Status: ACTIVE | Noted: 2024-09-27

## 2024-10-04 ENCOUNTER — OUTPATIENT (OUTPATIENT)
Dept: OUTPATIENT SERVICES | Facility: HOSPITAL | Age: 74
LOS: 1 days | Discharge: HOME | End: 2024-10-04
Payer: MEDICARE

## 2024-10-04 VITALS
TEMPERATURE: 98 F | HEART RATE: 64 BPM | SYSTOLIC BLOOD PRESSURE: 170 MMHG | OXYGEN SATURATION: 98 % | DIASTOLIC BLOOD PRESSURE: 91 MMHG | WEIGHT: 167.99 LBS | HEIGHT: 64 IN | RESPIRATION RATE: 17 BRPM

## 2024-10-04 VITALS — HEART RATE: 70 BPM | DIASTOLIC BLOOD PRESSURE: 70 MMHG | RESPIRATION RATE: 18 BRPM | SYSTOLIC BLOOD PRESSURE: 165 MMHG

## 2024-10-04 DIAGNOSIS — I99.9 UNSPECIFIED DISORDER OF CIRCULATORY SYSTEM: Chronic | ICD-10-CM

## 2024-10-04 DIAGNOSIS — Z98.890 OTHER SPECIFIED POSTPROCEDURAL STATES: Chronic | ICD-10-CM

## 2024-10-04 DIAGNOSIS — H25.11 AGE-RELATED NUCLEAR CATARACT, RIGHT EYE: ICD-10-CM

## 2024-10-04 LAB — GLUCOSE BLDC GLUCOMTR-MCNC: 115 MG/DL — HIGH (ref 70–99)

## 2024-10-04 PROCEDURE — V2632: CPT

## 2024-10-04 PROCEDURE — 82962 GLUCOSE BLOOD TEST: CPT

## 2024-10-04 RX ORDER — METFORMIN HYDROCHLORIDE 850 MG/1
0 TABLET, FILM COATED ORAL
Refills: 0 | DISCHARGE

## 2024-10-04 NOTE — ASU PATIENT PROFILE, ADULT - NSICDXPASTSURGICALHX_GEN_ALL_CORE_FT
PAST SURGICAL HISTORY:  H/O colonoscopy     History of surgery LEFT EYE CATARACT EXTRACTION WITH LENS IMPLANT    Impaired circulation of right leg      Cyclosporine Counseling:  I discussed with the patient the risks of cyclosporine including but not limited to hypertension, gingival hyperplasia,myelosuppression, immunosuppression, liver damage, kidney damage, neurotoxicity, lymphoma, and serious infections. The patient understands that monitoring is required including baseline blood pressure, CBC, CMP, lipid panel and uric acid, and then 1-2 times monthly CMP and blood pressure.

## 2024-10-04 NOTE — ASU PATIENT PROFILE, ADULT - FALL HARM RISK - UNIVERSAL INTERVENTIONS
Bed in lowest position, wheels locked, appropriate side rails in place/Call bell, personal items and telephone in reach/Instruct patient to call for assistance before getting out of bed or chair/Non-slip footwear when patient is out of bed/[place] to call system/Physically safe environment - no spills, clutter or unnecessary equipment/Purposeful Proactive Rounding/Room/bathroom lighting operational, light cord in reach

## 2024-10-08 DIAGNOSIS — I10 ESSENTIAL (PRIMARY) HYPERTENSION: ICD-10-CM

## 2024-10-08 DIAGNOSIS — Z79.84 LONG TERM (CURRENT) USE OF ORAL HYPOGLYCEMIC DRUGS: ICD-10-CM

## 2024-10-08 DIAGNOSIS — H26.8 OTHER SPECIFIED CATARACT: ICD-10-CM

## 2024-10-08 DIAGNOSIS — H26.9 UNSPECIFIED CATARACT: ICD-10-CM

## 2024-10-08 DIAGNOSIS — E11.36 TYPE 2 DIABETES MELLITUS WITH DIABETIC CATARACT: ICD-10-CM

## 2024-11-18 ENCOUNTER — APPOINTMENT (OUTPATIENT)
Dept: CARDIOLOGY | Facility: CLINIC | Age: 74
End: 2024-11-18

## 2025-07-08 ENCOUNTER — NON-APPOINTMENT (OUTPATIENT)
Age: 75
End: 2025-07-08

## 2025-07-10 ENCOUNTER — APPOINTMENT (OUTPATIENT)
Dept: CARDIOLOGY | Facility: CLINIC | Age: 75
End: 2025-07-10
Payer: MEDICARE

## 2025-07-10 VITALS
DIASTOLIC BLOOD PRESSURE: 82 MMHG | SYSTOLIC BLOOD PRESSURE: 124 MMHG | WEIGHT: 172 LBS | HEIGHT: 63 IN | BODY MASS INDEX: 30.48 KG/M2 | HEART RATE: 67 BPM

## 2025-07-10 PROCEDURE — 93000 ELECTROCARDIOGRAM COMPLETE: CPT

## 2025-07-10 PROCEDURE — 99214 OFFICE O/P EST MOD 30 MIN: CPT | Mod: 25

## 2025-08-11 ENCOUNTER — APPOINTMENT (OUTPATIENT)
Age: 75
End: 2025-08-11

## 2025-09-01 DIAGNOSIS — Z01.419 ENCOUNTER FOR GYNECOLOGICAL EXAMINATION (GENERAL) (ROUTINE) W/OUT ABNORMAL FINDINGS: ICD-10-CM

## 2025-09-02 ENCOUNTER — APPOINTMENT (OUTPATIENT)
Dept: OBGYN | Facility: CLINIC | Age: 75
End: 2025-09-02

## 2025-09-02 ENCOUNTER — NON-APPOINTMENT (OUTPATIENT)
Age: 75
End: 2025-09-02

## 2025-09-02 ENCOUNTER — APPOINTMENT (OUTPATIENT)
Dept: OBGYN | Facility: CLINIC | Age: 75
End: 2025-09-02
Payer: MEDICARE

## 2025-09-02 VITALS
DIASTOLIC BLOOD PRESSURE: 82 MMHG | SYSTOLIC BLOOD PRESSURE: 132 MMHG | HEIGHT: 63 IN | WEIGHT: 175 LBS | HEART RATE: 78 BPM | BODY MASS INDEX: 31.01 KG/M2

## 2025-09-02 DIAGNOSIS — Z80.0 FAMILY HISTORY OF MALIGNANT NEOPLASM OF DIGESTIVE ORGANS: ICD-10-CM

## 2025-09-02 DIAGNOSIS — Z80.3 FAMILY HISTORY OF MALIGNANT NEOPLASM OF BREAST: ICD-10-CM

## 2025-09-02 DIAGNOSIS — Z83.3 FAMILY HISTORY OF DIABETES MELLITUS: ICD-10-CM

## 2025-09-02 DIAGNOSIS — Z80.7 FAMILY HISTORY OF OTHER MALIGNANT NEOPLASMS OF LYMPHOID, HEMATOPOIETIC AND RELATED TISSUES: ICD-10-CM

## 2025-09-02 DIAGNOSIS — D21.9 BENIGN NEOPLASM OF CONNECTIVE AND OTHER SOFT TISSUE, UNSPECIFIED: ICD-10-CM

## 2025-09-02 DIAGNOSIS — Z82.49 FAMILY HISTORY OF ISCHEMIC HEART DISEASE AND OTHER DISEASES OF THE CIRCULATORY SYSTEM: ICD-10-CM

## 2025-09-02 PROCEDURE — G0101: CPT

## 2025-09-02 PROCEDURE — 76830 TRANSVAGINAL US NON-OB: CPT | Mod: 59

## 2025-09-04 LAB — HPV HIGH+LOW RISK DNA PNL CVX: NOT DETECTED
